# Patient Record
Sex: FEMALE | Race: WHITE | HISPANIC OR LATINO | Employment: UNEMPLOYED | URBAN - METROPOLITAN AREA
[De-identification: names, ages, dates, MRNs, and addresses within clinical notes are randomized per-mention and may not be internally consistent; named-entity substitution may affect disease eponyms.]

---

## 2023-12-14 ENCOUNTER — APPOINTMENT (OUTPATIENT)
Dept: ULTRASOUND IMAGING | Facility: HOSPITAL | Age: 38
End: 2023-12-14
Payer: OTHER GOVERNMENT

## 2023-12-14 ENCOUNTER — HOSPITAL ENCOUNTER (EMERGENCY)
Facility: HOSPITAL | Age: 38
Discharge: HOME | End: 2023-12-14
Attending: EMERGENCY MEDICINE
Payer: OTHER GOVERNMENT

## 2023-12-14 VITALS
RESPIRATION RATE: 18 BRPM | WEIGHT: 192.68 LBS | TEMPERATURE: 98 F | OXYGEN SATURATION: 100 % | DIASTOLIC BLOOD PRESSURE: 89 MMHG | HEIGHT: 62 IN | HEART RATE: 64 BPM | SYSTOLIC BLOOD PRESSURE: 124 MMHG | BODY MASS INDEX: 35.46 KG/M2

## 2023-12-14 DIAGNOSIS — D21.9 FIBROIDS: Primary | ICD-10-CM

## 2023-12-14 DIAGNOSIS — N83.202 LEFT OVARIAN CYST: ICD-10-CM

## 2023-12-14 LAB
ALBUMIN SERPL-MCNC: 4.3 G/DL (ref 3.5–5.2)
ALBUMIN/GLOB SERPL: 1.7 G/DL
ALP SERPL-CCNC: 111 U/L (ref 39–117)
ALT SERPL W P-5'-P-CCNC: 8 U/L (ref 1–33)
ANION GAP SERPL CALCULATED.3IONS-SCNC: 9.2 MMOL/L (ref 5–15)
AST SERPL-CCNC: 13 U/L (ref 1–32)
BASOPHILS # BLD AUTO: 0.02 10*3/MM3 (ref 0–0.2)
BASOPHILS NFR BLD AUTO: 0.3 % (ref 0–1.5)
BILIRUB SERPL-MCNC: 0.5 MG/DL (ref 0–1.2)
BUN SERPL-MCNC: 7 MG/DL (ref 6–20)
BUN/CREAT SERPL: 10.4 (ref 7–25)
CALCIUM SPEC-SCNC: 8.8 MG/DL (ref 8.6–10.5)
CHLORIDE SERPL-SCNC: 103 MMOL/L (ref 98–107)
CO2 SERPL-SCNC: 26.8 MMOL/L (ref 22–29)
CREAT SERPL-MCNC: 0.67 MG/DL (ref 0.57–1)
DEPRECATED RDW RBC AUTO: 43.8 FL (ref 37–54)
EGFRCR SERPLBLD CKD-EPI 2021: 116.3 ML/MIN/1.73
EOSINOPHIL # BLD AUTO: 0.06 10*3/MM3 (ref 0–0.4)
EOSINOPHIL NFR BLD AUTO: 1 % (ref 0.3–6.2)
ERYTHROCYTE [DISTWIDTH] IN BLOOD BY AUTOMATED COUNT: 12.7 % (ref 12.3–15.4)
GLOBULIN UR ELPH-MCNC: 2.5 GM/DL
GLUCOSE SERPL-MCNC: 98 MG/DL (ref 65–99)
HCG INTACT+B SERPL-ACNC: <0.5 MIU/ML
HCT VFR BLD AUTO: 40.9 % (ref 34–46.6)
HGB BLD-MCNC: 13.9 G/DL (ref 12–15.9)
HOLD SPECIMEN: NORMAL
HOLD SPECIMEN: NORMAL
IMM GRANULOCYTES # BLD AUTO: 0.01 10*3/MM3 (ref 0–0.05)
IMM GRANULOCYTES NFR BLD AUTO: 0.2 % (ref 0–0.5)
LYMPHOCYTES # BLD AUTO: 1.23 10*3/MM3 (ref 0.7–3.1)
LYMPHOCYTES NFR BLD AUTO: 21.4 % (ref 19.6–45.3)
MCH RBC QN AUTO: 31.9 PG (ref 26.6–33)
MCHC RBC AUTO-ENTMCNC: 34 G/DL (ref 31.5–35.7)
MCV RBC AUTO: 93.8 FL (ref 79–97)
MONOCYTES # BLD AUTO: 0.25 10*3/MM3 (ref 0.1–0.9)
MONOCYTES NFR BLD AUTO: 4.4 % (ref 5–12)
NEUTROPHILS NFR BLD AUTO: 4.17 10*3/MM3 (ref 1.7–7)
NEUTROPHILS NFR BLD AUTO: 72.7 % (ref 42.7–76)
NRBC BLD AUTO-RTO: 0 /100 WBC (ref 0–0.2)
PLATELET # BLD AUTO: 309 10*3/MM3 (ref 140–450)
PMV BLD AUTO: 9.7 FL (ref 6–12)
POTASSIUM SERPL-SCNC: 3.9 MMOL/L (ref 3.5–5.2)
PROT SERPL-MCNC: 6.8 G/DL (ref 6–8.5)
RBC # BLD AUTO: 4.36 10*6/MM3 (ref 3.77–5.28)
SODIUM SERPL-SCNC: 139 MMOL/L (ref 136–145)
WBC NRBC COR # BLD AUTO: 5.74 10*3/MM3 (ref 3.4–10.8)
WHOLE BLOOD HOLD COAG: NORMAL
WHOLE BLOOD HOLD SPECIMEN: NORMAL

## 2023-12-14 PROCEDURE — 36415 COLL VENOUS BLD VENIPUNCTURE: CPT

## 2023-12-14 PROCEDURE — 76830 TRANSVAGINAL US NON-OB: CPT

## 2023-12-14 PROCEDURE — 80053 COMPREHEN METABOLIC PANEL: CPT | Performed by: EMERGENCY MEDICINE

## 2023-12-14 PROCEDURE — 84702 CHORIONIC GONADOTROPIN TEST: CPT | Performed by: EMERGENCY MEDICINE

## 2023-12-14 PROCEDURE — 99284 EMERGENCY DEPT VISIT MOD MDM: CPT

## 2023-12-14 PROCEDURE — 36415 COLL VENOUS BLD VENIPUNCTURE: CPT | Performed by: EMERGENCY MEDICINE

## 2023-12-14 PROCEDURE — 85025 COMPLETE CBC W/AUTO DIFF WBC: CPT

## 2023-12-14 RX ORDER — IBUPROFEN 400 MG/1
800 TABLET ORAL ONCE
Status: COMPLETED | OUTPATIENT
Start: 2023-12-14 | End: 2023-12-14

## 2023-12-14 RX ADMIN — IBUPROFEN 800 MG: 400 TABLET ORAL at 14:09

## 2023-12-14 NOTE — ED PROVIDER NOTES
"Time: 4:40 PM EST  Date of encounter:  12/14/2023  Independent Historian/Clinical History and Information was obtained by:   Patient    History is limited by: N/A    Chief Complaint   Patient presents with    Abdominal Pain    Vaginal Bleeding         History of Present Illness:  Patient is a 38 y.o. year old female who presents to the emergency department for evaluation of vaginal bleeding and pelvic cramping.  Patient states she has had her IUD has been placed for 2 years removed recently.  Since then she has been having heavy vaginal bleeding with golf size clots.  She is also having pelvic pain and cramping.  Patient states the vaginal bleeding has started to lighten up however she still cramping.  She has a history of ovarian cysts during teenage years.    Patient Care Team  Primary Care Provider: Obie Whitlock MD    Past Medical History:     Allergies   Allergen Reactions    Codeine Other (See Comments)     Respiratory depression     Past Medical History:   Diagnosis Date    Asthma      History reviewed. No pertinent surgical history.  History reviewed. No pertinent family history.    Home Medications:  Prior to Admission medications    Not on File        Social History:          Review of Systems:  Review of Systems   Constitutional: Negative.    HENT: Negative.     Eyes: Negative.    Respiratory: Negative.     Cardiovascular: Negative.    Gastrointestinal: Negative.    Endocrine: Negative.    Genitourinary:  Positive for pelvic pain and vaginal bleeding.   Musculoskeletal: Negative.    Skin: Negative.    Allergic/Immunologic: Negative.    Neurological: Negative.    Hematological: Negative.    Psychiatric/Behavioral: Negative.          Physical Exam:  /89 (BP Location: Right arm, Patient Position: Sitting)   Pulse 64   Temp 98 °F (36.7 °C) (Oral)   Resp 18   Ht 157.5 cm (62\")   Wt 87.4 kg (192 lb 10.9 oz)   LMP  (LMP Unknown)   SpO2 100%   BMI 35.24 kg/m²         Physical Exam  Vitals and " nursing note reviewed.   Constitutional:       Appearance: Normal appearance.   HENT:      Head: Normocephalic and atraumatic.      Nose: Nose normal.      Mouth/Throat:      Mouth: Mucous membranes are moist.   Eyes:      Extraocular Movements: Extraocular movements intact.      Conjunctiva/sclera: Conjunctivae normal.      Pupils: Pupils are equal, round, and reactive to light.   Cardiovascular:      Rate and Rhythm: Normal rate and regular rhythm.      Heart sounds: Normal heart sounds.   Pulmonary:      Effort: Pulmonary effort is normal.      Breath sounds: Normal breath sounds.   Abdominal:      General: Abdomen is flat.      Palpations: Abdomen is soft.      Tenderness: There is abdominal tenderness (Pelvic area).   Musculoskeletal:         General: Normal range of motion.      Cervical back: Normal range of motion and neck supple.   Skin:     General: Skin is warm and dry.   Neurological:      General: No focal deficit present.      Mental Status: She is alert and oriented to person, place, and time.   Psychiatric:         Mood and Affect: Mood normal.         Behavior: Behavior normal.                  Procedures:  Procedures      Medical Decision Making:      Comorbidities that affect care:    Asthma    External Notes reviewed:    None      The following orders were placed and all results were independently analyzed by me:  Orders Placed This Encounter   Procedures    US Non-ob Transvaginal    Comprehensive Metabolic Panel    [place] Draw    hCG, Quantitative, Pregnancy    CBC Auto Differential    Ambulatory Referral to Obstetrics / Gynecology    Green Top (Gel)    Lavender Top    Gold Top - SST    Light Blue Top    CBC & Differential       Medications Given in the Emergency Department:  Medications   ibuprofen (ADVIL,MOTRIN) tablet 800 mg (800 mg Oral Given 12/14/23 1409)        ED Course:    The patient was initially evaluated in the triage area where orders were placed. The patient was later  dispositioned by Cuco Rocha PA-C.      The patient was advised to stay for completion of workup which includes but is not limited to communication of labs and radiological results, reassessment and plan. The patient was advised that leaving prior to disposition by a provider could result in critical findings that are not communicated to the patient.          Labs:    Lab Results (last 24 hours)       Procedure Component Value Units Date/Time    Comprehensive Metabolic Panel [[id number]] Collected: 12/14/23 1302    Specimen: Blood from Arm, Right Updated: 12/14/23 1330     Glucose 98 mg/dL      BUN 7 mg/dL      Creatinine 0.67 mg/dL      Sodium 139 mmol/L      Potassium 3.9 mmol/L      Chloride 103 mmol/L      CO2 26.8 mmol/L      Calcium 8.8 mg/dL      Total Protein 6.8 g/dL      Albumin 4.3 g/dL      ALT (SGPT) 8 U/L      AST (SGOT) 13 U/L      Alkaline Phosphatase 111 U/L      Total Bilirubin 0.5 mg/dL      Globulin 2.5 gm/dL      A/G Ratio 1.7 g/dL      BUN/Creatinine Ratio 10.4     Anion Gap 9.2 mmol/L      eGFR 116.3 mL/min/1.73     Narrative:      GFR Normal >60  Chronic Kidney Disease <60  Kidney Failure <15      hCG, Quantitative, Pregnancy [[id number]] Collected: 12/14/23 1302    Specimen: Blood from Arm, Right Updated: 12/14/23 1335     HCG Quantitative <0.50 mIU/mL     Narrative:      HCG Ranges by Gestational Age    Females - non-pregnant premenopausal   </= 1mIU/mL HCG  Females - postmenopausal               </= 7mIU/mL HCG    3 Weeks       5.4   -      72 mIU/mL  4 Weeks      10.2   -     708 mIU/mL  5 Weeks       217   -   8,245 mIU/mL  6 Weeks       152   -  32,177 mIU/mL  7 Weeks     4,059   - 153,767 mIU/mL  8 Weeks    31,366   - 149,094 mIU/mL  9 Weeks    59,109   - 135,901 mIU/mL  10 Weeks   44,186   - 170,409 mIU/mL  12 Weeks   27,107   - 201,615 mIU/mL  14 Weeks   24,302   -  93,646 mIU/mL  15 Weeks   12,540   -  69,747 mIU/mL  16 Weeks    8,904   -  55,332 mIU/mL  17 Weeks    8,240   -   51,793 mIU/mL  18 Weeks    9,649   -  55,271 mIU/mL      CBC & Differential [[id number]]  (Abnormal) Collected: 12/14/23 1302    Specimen: Blood from Arm, Right Updated: 12/14/23 1343    Narrative:      The following orders were created for panel order CBC & Differential.  Procedure                               Abnormality         Status                     ---------                               -----------         ------                     CBC Auto Differential[[id number]]        Abnormal            Final result                 Please view results for these tests on the individual orders.    CBC Auto Differential [[id number]]  (Abnormal) Collected: 12/14/23 1302    Specimen: Blood from Arm, Right Updated: 12/14/23 1343     WBC 5.74 10*3/mm3      RBC 4.36 10*6/mm3      Hemoglobin 13.9 g/dL      Hematocrit 40.9 %      MCV 93.8 fL      MCH 31.9 pg      MCHC 34.0 g/dL      RDW 12.7 %      RDW-SD 43.8 fl      MPV 9.7 fL      Platelets 309 10*3/mm3      Neutrophil % 72.7 %      Lymphocyte % 21.4 %      Monocyte % 4.4 %      Eosinophil % 1.0 %      Basophil % 0.3 %      Immature Grans % 0.2 %      Neutrophils, Absolute 4.17 10*3/mm3      Lymphocytes, Absolute 1.23 10*3/mm3      Monocytes, Absolute 0.25 10*3/mm3      Eosinophils, Absolute 0.06 10*3/mm3      Basophils, Absolute 0.02 10*3/mm3      Immature Grans, Absolute 0.01 10*3/mm3      nRBC 0.0 /100 WBC              Imaging:    US Non-ob Transvaginal    Result Date: 12/14/2023  PROCEDURE: US NON-OB TRANSVAGINAL  COMPARISON: None  INDICATIONS: pelvic cramping/heavy bleeding/clots post iUD removal  TECHNIQUE: Ultrasound examination of the pelvis was performed, using endovaginal technique.   FINDINGS:  Uterus measures 8.1 x 4.6 x 5.2 cm in size.  The myometrium is heterogeneous.  Within the posterior midbody of the uterus there is a 1.4 cm hypoechoic mass consistent with a uterine fibroid.  There is an additional 2.2 cm fibroid within the posterior fundus.  the endometrial  stripe is within normal limits measuring 5 mm.  There is small amount of fluid within the endometrial cavity.  Right ovary measures 4.0 x 2.4 x 2.4 cm. Left ovary measures 3.7 x 2.2 x 3.1 cm.  Within the left ovary there is a complex cyst measuring 2.1 x 1.5 cm in size, likely hemorrhagic cyst given the patient's age.  There are multiple normal-sized follicles within the right ovary.  There is color Doppler flow to both ovaries.  No free intraperitoneal fluid.        1. Multiple uterine fibroids 2. Normal endometrial stripe of 5 mm.  There is a small amount of fluid within the endometrial cavity 3. Complex left ovarian cyst measuring 2.1 cm in size likely hemorrhagic cyst.  Follow-up ultrasound could be performed in 6 weeks to document resolution.     GEOVANI RAY MD       Electronically Signed and Approved By: GEOVANI RAY MD on 12/14/2023 at 15:58                Differential Diagnosis and Discussion:      Pelvic Pain: Differential diagnosis includes but is not limited to ectopic pregnancy, ovarian torsion, tubo-ovarian abscess, ovarian cyst, ovulation, oophoritis, abdominal pregnancy, appendicitis, diverticulitis, cystitis, and renal colic  Vaginal Bleeding: Differential diagnosis includes but is not limited to foreign body, tumor, vaginitis, dysfunctional uterine bleeding, endocrine abnormalities, coagulation disorder, systemic illness, polyps, complications of pregnancy (possible ectopic pregnancy).    All labs were reviewed and interpreted by me.  Ultrasound impression was interpreted by me.     MDM     Amount and/or Complexity of Data Reviewed  Clinical lab tests: reviewed  Tests in the radiology section of CPT®: reviewed                 Patient Care Considerations:    CHEST X-RAY: I considered ordering a chest x-ray however CTA in all lobes      Consultants/Shared Management Plan:    None    Social Determinants of Health:    Patient is independent, reliable, and has access to care.       Disposition and  Care Coordination:    Discharged: The patient is suitable and stable for discharge with no need for consideration of observation or admission.    I have explained the patient´s condition, diagnoses and treatment plan based on the information available to me at this time. I have answered questions and addressed any concerns. The patient has a good  understanding of the patient´s diagnosis, condition, and treatment plan as can be expected at this point. The vital signs have been stable. The patient´s condition is stable and appropriate for discharge from the emergency department.      The patient will pursue further outpatient evaluation with the primary care physician or other designated or consulting physician as outlined in the discharge instructions. They are agreeable to this plan of care and follow-up instructions have been explained in detail. The patient has received these instructions in written format and have expressed an understanding of the discharge instructions. The patient is aware that any significant change in condition or worsening of symptoms should prompt an immediate return to this or the closest emergency department or call to 911.  I have explained discharge medications and the need for follow up with the patient/caretakers. This was also printed in the discharge instructions. Patient was discharged with the following medications and follow up:      Medication List      No changes were made to your prescriptions during this visit.      No follow-up provider specified.     Final diagnoses:   Fibroids   Left ovarian cyst        ED Disposition       ED Disposition   Discharge    Condition   Stable    Comment   --               This medical record created using voice recognition software.             Cuco Rocha PA-C  12/14/23 2997

## 2023-12-14 NOTE — DISCHARGE INSTRUCTIONS
All your lab work shows no abnormalities, your ultrasound shows that you have multiple uterine fibroids along with a left hemorrhagic cyst  I Put in a referral for gynecology, you should be receiving a phone call to schedule follow-up appointment

## 2023-12-14 NOTE — ED TRIAGE NOTES
Sent by PCP for eval of lower abd pain/bleeding post IUD removal on 12/2/23. Pt states she requested removal d/t new bleeding/abd cramping.  States that PCP didn't have ultrasound to evaluate fully. Pt's states that she's had clots since Wednesday night and has saturated an average of 1-2 pads/hour.  Denies fever.

## 2024-01-08 ENCOUNTER — TELEPHONE (OUTPATIENT)
Dept: OBSTETRICS AND GYNECOLOGY | Facility: CLINIC | Age: 39
End: 2024-01-08
Payer: OTHER GOVERNMENT

## 2024-01-15 ENCOUNTER — OFFICE VISIT (OUTPATIENT)
Dept: OBSTETRICS AND GYNECOLOGY | Facility: CLINIC | Age: 39
End: 2024-01-15
Payer: OTHER GOVERNMENT

## 2024-01-15 VITALS — BODY MASS INDEX: 34.78 KG/M2 | WEIGHT: 189 LBS | HEIGHT: 62 IN

## 2024-01-15 DIAGNOSIS — N94.6 DYSMENORRHEA: ICD-10-CM

## 2024-01-15 DIAGNOSIS — N93.9 ABNORMAL UTERINE BLEEDING: Primary | ICD-10-CM

## 2024-01-15 DIAGNOSIS — Z30.09 UNWANTED FERTILITY: ICD-10-CM

## 2024-01-15 DIAGNOSIS — D25.1 LEIOMYOMA, INTRAMURAL: ICD-10-CM

## 2024-01-15 PROCEDURE — 99204 OFFICE O/P NEW MOD 45 MIN: CPT | Performed by: OBSTETRICS & GYNECOLOGY

## 2024-01-15 RX ORDER — TRETINOIN 0.5 MG/G
CREAM TOPICAL
COMMUNITY
Start: 2024-01-04

## 2024-01-15 RX ORDER — ALBUTEROL SULFATE 90 UG/1
AEROSOL, METERED RESPIRATORY (INHALATION)
COMMUNITY
Start: 2023-11-07

## 2024-01-15 RX ORDER — CLINDAMYCIN PHOSPHATE 10 MG/G
GEL TOPICAL
COMMUNITY
Start: 2024-01-04

## 2024-01-15 NOTE — ASSESSMENT & PLAN NOTE
I strongly suspect that the patient's abnormal bleeding and cramping was due to a displaced IUD that has now been removed.  Her symptoms have now resolved.  It is likely that the fibroids noted to been there long preceding her symptoms.  Recommend patient continue to monitor for recurrence of symptoms and we can reevaluate if necessary.

## 2024-01-15 NOTE — ASSESSMENT & PLAN NOTE
The patient was using IUD for contraception.  She reports that this is the second IUD she has had problems with and she is not interested and pursuing another IUD.  She has no desire for future childbearing and is interested in permanent sterilization.  We have discussed the risks, benefits, and alternatives to the procedure.  We discussed the risks including the risk of infection, bleeding and hemorrhage, injury to nearby structure including, bowel, bladder, pelvic vasculature, pelvic nerves, ovaries, and the uterus, and other nearby structures.  We have discussed the risks of regret, risk of failure, and if failure occurred and increased risk for ectopic pregnancy.  We discussed the risk of menstrual changes, hormonal changes, and risk of pelvic pain post sterilization.  The patient has been offered alternative forms of birth control including: Oral contraceptives, NuvaRing, birth control patches, progesterone only birth control pills, Depo-Provera, all intrauterine contraceptives, partner vasectomy.  The patient expresses her understanding and wished to proceed with female permanent sterilization.  We have discussed the different methods of female sterilization including hysteroscopic and laparoscopic techniques.  With the laparoscopic techniques we have discussed occlusion of the tube with Filshie clips, Hulka clips, and Falope-Rings.  We have discussed cauterization of the fallopian tube, partial salpingectomy, and complete salpingectomy.  We will wait several weeks to reevaluate her irregular bleeding and dysmenorrhea prior to proceeding to surgery.

## 2024-01-15 NOTE — PROGRESS NOTES
"GYN Visit    CC: Follow-up from ER    HPI:   38 y.o. who presents in follow-up from the ER for pelvic pain and cramping as well as abnormal menstrual bleeding.  Patient reports that this was going on for almost 2 months.  She was having a significant amount of increase in menstrual bleeding.  During this time that she was bleeding she was also having a lot of pelvic cramping.  She was concerned it was her IUD so she saw her primary care provider.  The IUD was removed.  She was told the IUD was in a location.  After her IUD was removed her symptoms did not immediately improve.  She continued to have heavier bleeding and cramping.  She contacted her PCP once again and was told to go to the ER.  Upon her evaluation in the ER she was told she had uterine fibroids and this was likely why she was having her increase in symptoms.  Since her visit to the ER in December her bleeding and cramping both have subsided.  She is feeling much better now.  She is following up due to the presence of the fibroids.  She is also interested in permanent sterilization.    History: PMHx, Meds, Allergies, PSHx, Social Hx, and POBHx all reviewed and updated.    Ht 157.5 cm (62\")   Wt 85.7 kg (189 lb)   LMP 12/15/2023   Breastfeeding No   BMI 34.57 kg/m²     Physical Exam  Vitals and nursing note reviewed. Exam conducted with a chaperone present.   Constitutional:       General: She is not in acute distress.     Appearance: Normal appearance. She is not ill-appearing.   HENT:      Head: Normocephalic and atraumatic.   Abdominal:      General: Abdomen is flat. There is no distension.      Palpations: Abdomen is soft. There is no mass.      Tenderness: There is no abdominal tenderness. There is no guarding or rebound.      Hernia: No hernia is present. There is no hernia in the left inguinal area or right inguinal area.   Genitourinary:     General: Normal vulva.      Exam position: Lithotomy position.      Pubic Area: No rash.       " Labia:         Right: No rash, tenderness, lesion or injury.         Left: No rash, tenderness, lesion or injury.       Vagina: No signs of injury and foreign body. No vaginal discharge, erythema, tenderness or bleeding.      Cervix: No cervical motion tenderness, discharge, friability, lesion, erythema or cervical bleeding.      Uterus: Not deviated, not enlarged, not fixed and not tender.       Adnexa:         Right: No mass or tenderness.          Left: No mass or tenderness.        Comments: I am unable to palpate the fibroids noted on ultrasound  Musculoskeletal:         General: No swelling.      Right lower leg: No edema.      Left lower leg: No edema.   Skin:     General: Skin is warm and dry.      Findings: No rash.   Neurological:      Mental Status: She is alert and oriented to person, place, and time.   Psychiatric:         Mood and Affect: Mood normal.         Behavior: Behavior normal.         Thought Content: Thought content normal.         Judgment: Judgment normal.       Pelvic ultrasound 12/14/2023 reviewed  ED provider note 12/14/2023 reviewed  Labs from 12/14/2023 reviewed including CMP, hCG, CBC    ASSESSMENT AND PLAN:  Diagnoses and all orders for this visit:    1. Abnormal uterine bleeding (Primary)  Assessment & Plan:  I strongly suspect that the patient's abnormal bleeding and cramping was due to a displaced IUD that has now been removed.  Her symptoms have now resolved.  It is likely that the fibroids noted to been there long preceding her symptoms.  Recommend patient continue to monitor for recurrence of symptoms and we can reevaluate if necessary.      2. Dysmenorrhea  Assessment & Plan:  Currently resolved.  Recommend OTC NSAIDs if symptoms return.      3. Unwanted fertility  Assessment & Plan:  The patient was using IUD for contraception.  She reports that this is the second IUD she has had problems with and she is not interested and pursuing another IUD.  She has no desire for future  childbearing and is interested in permanent sterilization.  We have discussed the risks, benefits, and alternatives to the procedure.  We discussed the risks including the risk of infection, bleeding and hemorrhage, injury to nearby structure including, bowel, bladder, pelvic vasculature, pelvic nerves, ovaries, and the uterus, and other nearby structures.  We have discussed the risks of regret, risk of failure, and if failure occurred and increased risk for ectopic pregnancy.  We discussed the risk of menstrual changes, hormonal changes, and risk of pelvic pain post sterilization.  The patient has been offered alternative forms of birth control including: Oral contraceptives, NuvaRing, birth control patches, progesterone only birth control pills, Depo-Provera, all intrauterine contraceptives, partner vasectomy.  The patient expresses her understanding and wished to proceed with female permanent sterilization.  We have discussed the different methods of female sterilization including hysteroscopic and laparoscopic techniques.  With the laparoscopic techniques we have discussed occlusion of the tube with Filshie clips, Hulka clips, and Falope-Rings.  We have discussed cauterization of the fallopian tube, partial salpingectomy, and complete salpingectomy.  We will wait several weeks to reevaluate her irregular bleeding and dysmenorrhea prior to proceeding to surgery.      4. Leiomyoma, intramural  Overview:  1.4 cm and 2.2 cm.  Both are posterior.  1 is mid body and 1 is fundal.    Assessment & Plan:  I discussed uterine fibroids in great detail with the patient.  We discussed that most fibroids the size and location do not cause significant pelvic pain and/or even significant abnormal bleeding.  I suspect it more likely that the patient's IUD was partially expelled creating an increase in bleeding and cramping.          Counseling: TRACK MENSES, RTO if <q21 days (frequent) or >q3mo (infrequent IF not on hormonal BC),  >7d long, heavy, or painful.    All BIRTH CONTROL options R/B/A/SE/E of each reviewed in detail.  ELSIE risk w hormonal BIRTH CONTROL reviewed (estrogen containing only), S/Sx to watch for discussed and questions answered.  Newer studies indicate possible increased breast cancer reviewed (both estrogen and progestin only).    Follow Up:  Return in about 3 weeks (around 2/5/2024) for Recheck.    Nathan Massey MD  01/15/2024

## 2024-01-15 NOTE — ASSESSMENT & PLAN NOTE
I discussed uterine fibroids in great detail with the patient.  We discussed that most fibroids the size and location do not cause significant pelvic pain and/or even significant abnormal bleeding.  I suspect it more likely that the patient's IUD was partially expelled creating an increase in bleeding and cramping.

## 2024-02-12 ENCOUNTER — OFFICE VISIT (OUTPATIENT)
Dept: OBSTETRICS AND GYNECOLOGY | Facility: CLINIC | Age: 39
End: 2024-02-12
Payer: OTHER GOVERNMENT

## 2024-02-12 VITALS
BODY MASS INDEX: 35.15 KG/M2 | DIASTOLIC BLOOD PRESSURE: 80 MMHG | WEIGHT: 191 LBS | SYSTOLIC BLOOD PRESSURE: 115 MMHG | HEIGHT: 62 IN | HEART RATE: 59 BPM

## 2024-02-12 DIAGNOSIS — D25.1 LEIOMYOMA, INTRAMURAL: ICD-10-CM

## 2024-02-12 DIAGNOSIS — Z30.011 ENCOUNTER FOR INITIAL PRESCRIPTION OF CONTRACEPTIVE PILLS: ICD-10-CM

## 2024-02-12 DIAGNOSIS — N94.6 DYSMENORRHEA: ICD-10-CM

## 2024-02-12 DIAGNOSIS — N93.9 ABNORMAL UTERINE BLEEDING: Primary | ICD-10-CM

## 2024-02-12 PROCEDURE — 99213 OFFICE O/P EST LOW 20 MIN: CPT | Performed by: OBSTETRICS & GYNECOLOGY

## 2024-02-12 RX ORDER — FLUTICASONE PROPIONATE AND SALMETEROL XINAFOATE 45; 21 UG/1; UG/1
AEROSOL, METERED RESPIRATORY (INHALATION)
COMMUNITY
Start: 2024-02-07

## 2024-02-13 PROBLEM — Z30.011 ENCOUNTER FOR INITIAL PRESCRIPTION OF CONTRACEPTIVE PILLS: Status: ACTIVE | Noted: 2024-02-13

## 2024-05-13 ENCOUNTER — OFFICE VISIT (OUTPATIENT)
Dept: OBSTETRICS AND GYNECOLOGY | Facility: CLINIC | Age: 39
End: 2024-05-13
Payer: OTHER GOVERNMENT

## 2024-05-13 VITALS
WEIGHT: 187 LBS | SYSTOLIC BLOOD PRESSURE: 123 MMHG | BODY MASS INDEX: 34.41 KG/M2 | HEIGHT: 62 IN | HEART RATE: 64 BPM | DIASTOLIC BLOOD PRESSURE: 86 MMHG

## 2024-05-13 DIAGNOSIS — Z30.41 ENCOUNTER FOR SURVEILLANCE OF CONTRACEPTIVE PILLS: ICD-10-CM

## 2024-05-13 DIAGNOSIS — N93.9 ABNORMAL UTERINE BLEEDING: Primary | ICD-10-CM

## 2024-05-13 DIAGNOSIS — N94.6 DYSMENORRHEA: ICD-10-CM

## 2024-05-13 DIAGNOSIS — D25.1 LEIOMYOMA, INTRAMURAL: ICD-10-CM

## 2024-05-13 PROBLEM — Z30.09 UNWANTED FERTILITY: Status: RESOLVED | Noted: 2024-01-15 | Resolved: 2024-05-13

## 2024-05-13 PROCEDURE — 99213 OFFICE O/P EST LOW 20 MIN: CPT | Performed by: OBSTETRICS & GYNECOLOGY

## 2024-05-13 RX ORDER — MELOXICAM 15 MG/1
TABLET ORAL
COMMUNITY
Start: 2024-05-09

## 2024-05-13 RX ORDER — ERGOCALCIFEROL 1.25 MG/1
CAPSULE ORAL
COMMUNITY
Start: 2024-03-15

## 2024-05-13 RX ORDER — CETIRIZINE HYDROCHLORIDE 10 MG/1
TABLET ORAL
COMMUNITY
Start: 2024-02-12

## 2024-05-13 NOTE — ASSESSMENT & PLAN NOTE
Happy with the Low-Ogestrel.  Continue Low-Ogestrel 1 tablet oral daily.  The risk, benefits, alternatives were reviewed including the risks of VTE.

## 2024-05-13 NOTE — PROGRESS NOTES
"[facility]  Gynecological Visit    CC: Follow-up meds    Subjective:   38 y.o. who presents in follow-up of medical therapy and the treatment of abnormal uterine bleeding and dysmenorrhea.  The patient was started on combined OCPs at her last office visit.  She states that the medication has worked very well.  Her menses are now regular lasting about 5 days with decreased flow and resolve dysmenorrhea.  She is very happy with the OCPs and wishes to stay on these.    History:   Past medical history, medications, allergies, surgical history, social history, and obstetrical history all reviewed and updated.    Last Completed Pap Smear       This patient has no relevant Health Maintenance data.          Objective:/86   Pulse 64   Ht 157.5 cm (62\")   Wt 84.8 kg (187 lb)   LMP 2024   Breastfeeding No   BMI 34.20 kg/m²     Physical Exam  Vitals and nursing note reviewed.   Constitutional:       General: She is not in acute distress.     Appearance: Normal appearance. She is not ill-appearing.   HENT:      Head: Normocephalic and atraumatic.   Neck:      Thyroid: No thyroid mass or thyromegaly.   Musculoskeletal:         General: No swelling.      Right lower leg: No edema.      Left lower leg: No edema.   Skin:     General: Skin is warm and dry.      Findings: No rash.   Neurological:      Mental Status: She is alert and oriented to person, place, and time.   Psychiatric:         Mood and Affect: Mood normal.         Behavior: Behavior normal.         Thought Content: Thought content normal.         Judgment: Judgment normal.       Assessment and Plan:  Diagnoses and all orders for this visit:    1. Abnormal uterine bleeding (Primary)  Assessment & Plan:  Symptoms resolved with the OCPs.  Continue to monitor.      2. Dysmenorrhea  Assessment & Plan:  Greatly improved with OCPs.  Recommend OTC NSAIDs as needed for any further dysmenorrhea.      3. Leiomyoma, " intramural  Overview:  1.4 cm and 2.2 cm.  Both are posterior.  1 is mid body and 1 is fundal.      4. Encounter for surveillance of contraceptive pills  Assessment & Plan:  Happy with the Low-Ogestrel.  Continue Low-Ogestrel 1 tablet oral daily.  The risk, benefits, alternatives were reviewed including the risks of VTE.    Orders:  -     norgestrel-ethinyl estradiol (LOW-OGESTREL,CRYSELLE) 0.3-30 MG-MCG per tablet; Take 1 tablet by mouth Daily.  Dispense: 28 tablet; Refill: 12        Counseling:     Follow Up:  No follow-ups on file.        Nathan Massey MD  05/13/2024

## 2024-05-28 ENCOUNTER — TELEPHONE (OUTPATIENT)
Dept: OBSTETRICS AND GYNECOLOGY | Facility: CLINIC | Age: 39
End: 2024-05-28
Payer: OTHER GOVERNMENT

## 2024-05-28 NOTE — TELEPHONE ENCOUNTER
Caller: Tiffany Martinez    Relationship to patient: Self    Best call back number: [phone]    Patient is needing: EST PT OF DR. CAMPUZANO WAS LAST SEEN 5/13/2024. DISCUSSED AND PRESCRIBED ORAL BIRTH CONTROL. PT WOULD LIKE TO HAVE MIRENA IUD. SHE USE TO HAVE THIS BIRTH CONTROL METHOD. [facility] INSURANCE

## 2024-11-21 PROCEDURE — 87635 SARS-COV-2 COVID-19 AMP PRB: CPT | Performed by: NURSE PRACTITIONER

## 2024-11-21 PROCEDURE — 87081 CULTURE SCREEN ONLY: CPT | Performed by: NURSE PRACTITIONER
